# Patient Record
Sex: MALE | Race: WHITE | NOT HISPANIC OR LATINO | URBAN - METROPOLITAN AREA
[De-identification: names, ages, dates, MRNs, and addresses within clinical notes are randomized per-mention and may not be internally consistent; named-entity substitution may affect disease eponyms.]

---

## 2021-04-20 ENCOUNTER — OFFICE (OUTPATIENT)
Dept: URBAN - METROPOLITAN AREA CLINIC 11 | Facility: CLINIC | Age: 51
End: 2021-04-20
Payer: COMMERCIAL

## 2021-04-20 VITALS
WEIGHT: 160 LBS | HEIGHT: 67 IN | DIASTOLIC BLOOD PRESSURE: 81 MMHG | SYSTOLIC BLOOD PRESSURE: 141 MMHG | OXYGEN SATURATION: 99 % | HEART RATE: 77 BPM

## 2021-04-20 DIAGNOSIS — K57.90 DIVERTICULOSIS OF INTESTINE, PART UNSPECIFIED, WITHOUT PERFO: ICD-10-CM

## 2021-04-20 PROCEDURE — 99203 OFFICE O/P NEW LOW 30 MIN: CPT | Performed by: INTERNAL MEDICINE

## 2021-04-20 RX ORDER — SODIUM PICOSULFATE, MAGNESIUM OXIDE, AND ANHYDROUS CITRIC ACID 10; 3.5; 12 MG/160ML; G/160ML; G/160ML
LIQUID ORAL
Qty: 1 | Refills: 0 | Status: COMPLETED
Start: 2021-04-20 | End: 2021-05-24

## 2021-05-24 ENCOUNTER — AMBULATORY SURGICAL CENTER (OUTPATIENT)
Dept: URBAN - METROPOLITAN AREA SURGERY 3 | Facility: SURGERY | Age: 51
End: 2021-05-24
Payer: COMMERCIAL

## 2021-05-24 ENCOUNTER — OFFICE (OUTPATIENT)
Dept: URBAN - METROPOLITAN AREA PATHOLOGY 22 | Facility: PATHOLOGY | Age: 51
End: 2021-05-24
Payer: COMMERCIAL

## 2021-05-24 VITALS
WEIGHT: 156 LBS | SYSTOLIC BLOOD PRESSURE: 130 MMHG | OXYGEN SATURATION: 97 % | HEART RATE: 89 BPM | HEIGHT: 67 IN | RESPIRATION RATE: 20 BRPM | OXYGEN SATURATION: 99 % | RESPIRATION RATE: 20 BRPM | RESPIRATION RATE: 18 BRPM | RESPIRATION RATE: 24 BRPM | OXYGEN SATURATION: 98 % | SYSTOLIC BLOOD PRESSURE: 127 MMHG | SYSTOLIC BLOOD PRESSURE: 120 MMHG | SYSTOLIC BLOOD PRESSURE: 155 MMHG | OXYGEN SATURATION: 97 % | HEART RATE: 78 BPM | OXYGEN SATURATION: 96 % | DIASTOLIC BLOOD PRESSURE: 79 MMHG | RESPIRATION RATE: 24 BRPM | DIASTOLIC BLOOD PRESSURE: 74 MMHG | HEART RATE: 82 BPM | HEART RATE: 78 BPM | TEMPERATURE: 97.6 F | DIASTOLIC BLOOD PRESSURE: 85 MMHG | OXYGEN SATURATION: 96 % | TEMPERATURE: 97.7 F | TEMPERATURE: 97.7 F | HEART RATE: 84 BPM | HEART RATE: 82 BPM | HEIGHT: 67 IN | SYSTOLIC BLOOD PRESSURE: 127 MMHG | WEIGHT: 156 LBS | DIASTOLIC BLOOD PRESSURE: 79 MMHG | OXYGEN SATURATION: 96 % | OXYGEN SATURATION: 99 % | RESPIRATION RATE: 14 BRPM | HEART RATE: 89 BPM | OXYGEN SATURATION: 98 % | OXYGEN SATURATION: 97 % | DIASTOLIC BLOOD PRESSURE: 79 MMHG | SYSTOLIC BLOOD PRESSURE: 120 MMHG | HEART RATE: 80 BPM | HEIGHT: 67 IN | HEART RATE: 82 BPM | OXYGEN SATURATION: 99 % | HEART RATE: 89 BPM | RESPIRATION RATE: 24 BRPM | HEART RATE: 84 BPM | SYSTOLIC BLOOD PRESSURE: 130 MMHG | WEIGHT: 156 LBS | SYSTOLIC BLOOD PRESSURE: 155 MMHG | TEMPERATURE: 97.6 F | HEART RATE: 78 BPM | RESPIRATION RATE: 18 BRPM | SYSTOLIC BLOOD PRESSURE: 130 MMHG | DIASTOLIC BLOOD PRESSURE: 85 MMHG | SYSTOLIC BLOOD PRESSURE: 120 MMHG | RESPIRATION RATE: 14 BRPM | SYSTOLIC BLOOD PRESSURE: 155 MMHG | RESPIRATION RATE: 18 BRPM | HEART RATE: 80 BPM | RESPIRATION RATE: 14 BRPM | DIASTOLIC BLOOD PRESSURE: 85 MMHG | DIASTOLIC BLOOD PRESSURE: 74 MMHG | HEART RATE: 84 BPM | TEMPERATURE: 97.7 F | OXYGEN SATURATION: 98 % | RESPIRATION RATE: 20 BRPM | HEART RATE: 80 BPM | DIASTOLIC BLOOD PRESSURE: 74 MMHG | SYSTOLIC BLOOD PRESSURE: 127 MMHG | TEMPERATURE: 97.6 F

## 2021-05-24 DIAGNOSIS — K63.5 POLYP OF COLON: ICD-10-CM

## 2021-05-24 DIAGNOSIS — Z12.11 ENCOUNTER FOR SCREENING FOR MALIGNANT NEOPLASM OF COLON: ICD-10-CM

## 2021-05-24 DIAGNOSIS — K57.30 DIVERTICULOSIS OF LARGE INTESTINE WITHOUT PERFORATION OR ABS: ICD-10-CM

## 2021-05-24 PROCEDURE — 88305 TISSUE EXAM BY PATHOLOGIST: CPT | Performed by: INTERNAL MEDICINE

## 2021-05-24 PROCEDURE — 45380 COLONOSCOPY AND BIOPSY: CPT | Mod: 33 | Performed by: INTERNAL MEDICINE

## 2021-05-24 NOTE — SERVICEHPINOTES
50-year-old white male referred to us for colonoscopy after a significant complicated diverticulitis in February of this year. He had a small abscess that was drained percutaneously. He was hospitalized for a few days and then ultimately took antibiotics for about 2 weeks after hospitalization. He had a later CT scan that showed resolution. He has had no prior colon evaluation. He had at least 2 other episodes of diverticulitis treated with antibiotics as an outpatient over the last few years. There has been no rectal bleeding. There is no family history of colon cancer or polyps. He has no cardiac history and there is no chest pain or shortness of breath. He has changed his diet completely and is off of red meat and has increase the fiber in his diet and the water intake. He has about 3 bowel movements a day.

## 2021-05-26 ENCOUNTER — OFFICE (OUTPATIENT)
Dept: URBAN - METROPOLITAN AREA CLINIC 11 | Facility: CLINIC | Age: 51
End: 2021-05-26
Payer: COMMERCIAL

## 2021-05-26 VITALS
WEIGHT: 160 LBS | HEIGHT: 67 IN | SYSTOLIC BLOOD PRESSURE: 145 MMHG | OXYGEN SATURATION: 98 % | HEART RATE: 77 BPM | DIASTOLIC BLOOD PRESSURE: 87 MMHG

## 2021-05-26 DIAGNOSIS — R10.30 LOWER ABDOMINAL PAIN, UNSPECIFIED: ICD-10-CM

## 2021-05-26 PROCEDURE — 99213 OFFICE O/P EST LOW 20 MIN: CPT | Performed by: INTERNAL MEDICINE

## 2021-05-26 RX ORDER — METRONIDAZOLE 500 MG/1
TABLET, FILM COATED ORAL
Qty: 14 | Refills: 0 | Status: COMPLETED
Start: 2021-05-26 | End: 2023-06-27

## 2021-05-26 RX ORDER — CIPROFLOXACIN HYDROCHLORIDE 500 MG/1
1000 TABLET, FILM COATED ORAL
Qty: 14 | Refills: 0 | Status: COMPLETED
Start: 2021-05-26 | End: 2023-06-27

## 2023-06-27 ENCOUNTER — OFFICE (OUTPATIENT)
Dept: URBAN - METROPOLITAN AREA CLINIC 11 | Facility: CLINIC | Age: 53
End: 2023-06-27

## 2023-06-27 VITALS
HEIGHT: 67 IN | DIASTOLIC BLOOD PRESSURE: 93 MMHG | HEART RATE: 88 BPM | WEIGHT: 155 LBS | OXYGEN SATURATION: 97 % | SYSTOLIC BLOOD PRESSURE: 145 MMHG

## 2023-06-27 DIAGNOSIS — K57.32 DIVERTICULITIS OF LARGE INTESTINE WITHOUT PERFORATION OR ABS: ICD-10-CM

## 2023-06-27 PROCEDURE — 99214 OFFICE O/P EST MOD 30 MIN: CPT | Performed by: INTERNAL MEDICINE

## 2023-06-28 ENCOUNTER — OFFICE (OUTPATIENT)
Dept: URBAN - METROPOLITAN AREA CLINIC 22 | Facility: CLINIC | Age: 53
End: 2023-06-28

## 2023-06-28 DIAGNOSIS — K57.32 DIVERTICULITIS OF LARGE INTESTINE WITHOUT PERFORATION OR ABS: ICD-10-CM

## 2023-06-28 LAB
BASIC METABOLIC PANEL (8): BUN/CREATININE RATIO: 12 (ref 9–20)
BASIC METABOLIC PANEL (8): BUN: 11 MG/DL (ref 6–24)
BASIC METABOLIC PANEL (8): CALCIUM: 9.5 MG/DL (ref 8.7–10.2)
BASIC METABOLIC PANEL (8): CARBON DIOXIDE, TOTAL: 23 MMOL/L (ref 20–29)
BASIC METABOLIC PANEL (8): CHLORIDE: 100 MMOL/L (ref 96–106)
BASIC METABOLIC PANEL (8): CREATININE: 0.94 MG/DL (ref 0.76–1.27)
BASIC METABOLIC PANEL (8): EGFR: 96 ML/MIN/1.73 (ref 59–?)
BASIC METABOLIC PANEL (8): GLUCOSE: 84 MG/DL (ref 70–99)
BASIC METABOLIC PANEL (8): POTASSIUM: 4.8 MMOL/L (ref 3.5–5.2)
BASIC METABOLIC PANEL (8): SODIUM: 142 MMOL/L (ref 134–144)
CBC, PLATELET, NO DIFFERENTIAL: HEMATOCRIT: 48.2 % (ref 37.5–51)
CBC, PLATELET, NO DIFFERENTIAL: HEMOGLOBIN: 16 G/DL (ref 13–17.7)
CBC, PLATELET, NO DIFFERENTIAL: MCH: 30.6 PG (ref 26.6–33)
CBC, PLATELET, NO DIFFERENTIAL: MCHC: 33.2 G/DL (ref 31.5–35.7)
CBC, PLATELET, NO DIFFERENTIAL: MCV: 92 FL (ref 79–97)
CBC, PLATELET, NO DIFFERENTIAL: NRBC: (no result)
CBC, PLATELET, NO DIFFERENTIAL: PLATELETS: 350 X10E3/UL (ref 150–450)
CBC, PLATELET, NO DIFFERENTIAL: RBC: 5.23 X10E6/UL (ref 4.14–5.8)
CBC, PLATELET, NO DIFFERENTIAL: RDW: 12 % (ref 11.6–15.4)
CBC, PLATELET, NO DIFFERENTIAL: WBC: 11.8 X10E3/UL — HIGH (ref 3.4–10.8)

## 2023-06-28 PROCEDURE — 74177 CT ABD & PELVIS W/CONTRAST: CPT | Mod: TC | Performed by: INTERNAL MEDICINE
